# Patient Record
Sex: MALE | Race: WHITE | URBAN - METROPOLITAN AREA
[De-identification: names, ages, dates, MRNs, and addresses within clinical notes are randomized per-mention and may not be internally consistent; named-entity substitution may affect disease eponyms.]

---

## 2017-11-22 ENCOUNTER — OFFICE VISIT (OUTPATIENT)
Dept: FAMILY MEDICINE | Facility: CLINIC | Age: 32
End: 2017-11-22

## 2017-11-22 VITALS
TEMPERATURE: 98.5 F | HEART RATE: 96 BPM | DIASTOLIC BLOOD PRESSURE: 74 MMHG | OXYGEN SATURATION: 97 % | SYSTOLIC BLOOD PRESSURE: 112 MMHG | WEIGHT: 148 LBS | BODY MASS INDEX: 22.02 KG/M2 | RESPIRATION RATE: 16 BRPM

## 2017-11-22 DIAGNOSIS — B07.8 OTHER VIRAL WARTS: Primary | ICD-10-CM

## 2017-11-22 PROCEDURE — 17110 DESTRUCTION B9 LES UP TO 14: CPT | Performed by: FAMILY MEDICINE

## 2017-11-22 NOTE — MR AVS SNAPSHOT
After Visit Summary   11/22/2017    Joaquin Crow    MRN: [id number]           Patient Information     Date Of Birth          1985        Visit Information        Provider Department      11/22/2017 2:00 PM Antonino Polk MD; RF PROCEDURE ROOM [facility]        Today's Diagnoses     Other viral warts    -  1       Follow-ups after your visit        Who to contact     If you have questions or need follow up information about today's clinic visit or your schedule please contact [facility] directly at [phone].  Normal or non-critical lab and imaging results will be communicated to you by [employer], letter or phone within 4 business days after the clinic has received the results. If you do not hear from us within 7 days, please contact the clinic through Swapdomt or phone. If you have a critical or abnormal lab result, we will notify you by phone as soon as possible.  Submit refill requests through iLink or call your pharmacy and they will forward the refill request to us. Please allow 3 business days for your refill to be completed.          Additional Information About Your Visit        MyChart Information     iLink gives you secure access to your electronic health record. If you see a primary care provider, you can also send messages to your care team and make appointments. If you have questions, please call your primary care clinic.  If you do not have a primary care provider, please call [phone] and they will assist you.        Care EveryWhere ID     This is your Care EveryWhere ID. This could be used by other organizations to access your [facility] medical records  [id number]        Your Vitals Were     Pulse Temperature Respirations Pulse Oximetry BMI (Body Mass Index)       96 98.5  F (36.9  C) (Oral) 16 97% 22.02 kg/m2        Blood Pressure from Last 3 Encounters:   11/22/17 112/74   10/17/16 120/82   02/02/16 118/72    Weight from Last 3  Encounters:   11/22/17 67.1 kg (148 lb)   10/17/16 67.1 kg (148 lb)   02/02/16 68 kg (150 lb)              We Performed the Following     DESTRUCT BENIGN LESION, UP TO 14        Primary Care Provider Office Phone # Fax #    Antonino Polk [phone] [facility] [address]        Equal Access to Services     LUCIE PRAKASH : Hadii aad ku hadasho Soomaali, waaxda luqadaha, qaybta kaalmada adeegyada, waxay idiin hayaan adeeg kharash la'clyden . So [facility] [phone].    ATENCIÓN: Si habla español, tiene a barron disposición servicios gratuitos de asistencia lingüística. Llame al [phone].    We comply with applicable federal civil rights laws and [place] laws. We do not discriminate on the basis of race, color, national origin, age, disability, sex, sexual orientation, or gender identity.            Thank you!     Thank you for choosing [facility]  for your care. Our goal is always to provide you with excellent care. Hearing back from our patients is one way we can continue to improve our services. Please take a few minutes to complete the written survey that you may receive in the mail after your visit with us. Thank you!             Your Updated Medication List - Protect others around you: Learn how to safely use, store and throw away your medicines at www.disposemymeds.org.      Notice  As of 11/22/2017  2:34 PM    You have not been prescribed any medications.

## 2017-11-22 NOTE — PROGRESS NOTES
"3 warts on hands treated with shave and hyfrecation after alcohol prep and 1% lidocaine PF without epi. aquaphor and bandaid. RTC if not gone in 2-3 weeks for LN \"touch up\".  "

## 2019-03-29 ENCOUNTER — OFFICE VISIT (OUTPATIENT)
Dept: FAMILY MEDICINE | Facility: CLINIC | Age: 34
End: 2019-03-29

## 2019-03-29 VITALS
OXYGEN SATURATION: 98 % | BODY MASS INDEX: 20.23 KG/M2 | DIASTOLIC BLOOD PRESSURE: 80 MMHG | RESPIRATION RATE: 20 BRPM | TEMPERATURE: 98.9 F | WEIGHT: 136 LBS | SYSTOLIC BLOOD PRESSURE: 124 MMHG | HEART RATE: 88 BPM

## 2019-03-29 DIAGNOSIS — H00.14 CHALAZION LEFT UPPER EYELID: ICD-10-CM

## 2019-03-29 DIAGNOSIS — B07.8 OTHER VIRAL WARTS: Primary | ICD-10-CM

## 2019-03-29 PROCEDURE — 99213 OFFICE O/P EST LOW 20 MIN: CPT | Performed by: FAMILY MEDICINE

## 2019-03-29 RX ORDER — IMIQUIMOD 12.5 MG/.25G
CREAM TOPICAL
Qty: 12 PACKET | Refills: 1 | Status: SHIPPED | OUTPATIENT
Start: 2019-03-29

## 2019-03-29 NOTE — PROGRESS NOTES
SUBJECTIVE:  Chief Complaint:   Chief Complaint   Patient presents with     Wart     both hands. They have been treated multiple times with LN2 and paring-coming back worse.  Wants referral to derm to have laser treated.      Eye Problem     left eye growth x 3 months.      History of Present Illness:  Joaquin Crow is a 34 year old male who presents complaining of moderate left eye redness, eyelid swelling for 3 month(s).   Onset/timing: gradual, worsening.    Associated Signs and Symptoms: none  Treatment measures tried include: Sty treatment  Contact wearer : No    Past Medical History:   Diagnosis Date     Crohn's disease (H)      No current outpatient medications on file.      ROS:  CONSTITUTIONAL:NEGATIVE for fever, chills, change in weight  INTEGUMENTARY/SKIN: NEGATIVE for bruising     OBJECTIVE:  /80   Pulse 88   Temp 98.9  F (37.2  C) (Temporal)   Resp 20   Wt 61.7 kg (136 lb)   SpO2 98%   BMI 20.23 kg/m    General: no acute distress  Eye exam: right eye normal lid, conjunctiva, cornea, pupil and fundus, left eye abnormal findings: small growth noted internally L upper lid  Neck: supple, non-tender, free range of motion, no adenopathy  Heart: NORMAL - regular rate and rhythm without murmur.  Lungs: normal and clear to auscultation  SKIN: numerous warts on fingers    ASSESSMENT:  1. Other viral warts  Will refer and trial of aldara    - DERMATOLOGY REFERRAL  - imiquimod (ALDARA) 5 % external cream; Apply topically three times a week  Dispense: 12 packet; Refill: 1    2. Chalazion left upper eyelid  Referral for surgical removal  - OPHTHALMOLOGY ADULT REFERRAL

## 2019-09-30 ENCOUNTER — HEALTH MAINTENANCE LETTER (OUTPATIENT)
Age: 34
End: 2019-09-30

## 2021-01-15 ENCOUNTER — HEALTH MAINTENANCE LETTER (OUTPATIENT)
Age: 36
End: 2021-01-15

## 2021-10-24 ENCOUNTER — HEALTH MAINTENANCE LETTER (OUTPATIENT)
Age: 36
End: 2021-10-24

## 2022-02-13 ENCOUNTER — HEALTH MAINTENANCE LETTER (OUTPATIENT)
Age: 37
End: 2022-02-13

## 2022-04-18 ENCOUNTER — APPOINTMENT (OUTPATIENT)
Dept: URBAN - METROPOLITAN AREA CLINIC 256 | Age: 37
Setting detail: DERMATOLOGY
End: 2022-04-19

## 2022-04-18 VITALS — WEIGHT: 140 LBS | HEIGHT: 70 IN | RESPIRATION RATE: 15 BRPM

## 2022-04-18 DIAGNOSIS — B07.8 OTHER VIRAL WARTS: ICD-10-CM

## 2022-04-18 PROCEDURE — 11900 INJECT SKIN LESIONS </W 7: CPT

## 2022-04-18 PROCEDURE — OTHER BLEOMYCIN: OTHER

## 2022-04-18 PROCEDURE — OTHER MIPS QUALITY: OTHER

## 2022-04-18 ASSESSMENT — LOCATION SIMPLE DESCRIPTION DERM: LOCATION SIMPLE: LEFT MIDDLE FINGER

## 2022-04-18 ASSESSMENT — LOCATION DETAILED DESCRIPTION DERM: LOCATION DETAILED: LEFT MIDDLE PROXIMAL INTERPHALANGEAL JOINT

## 2022-04-18 ASSESSMENT — LOCATION ZONE DERM: LOCATION ZONE: FINGER

## 2022-04-18 NOTE — HPI: WART (PATIENT REPORTED)
Where Is Your Wart Located?: Fingers
Additional Comments (Use Complete Sentences): Patient has history of Crones disease. Patient reports having a 10 year history of stubborn warts that were resistant to most in clinic treatments.

## 2022-04-18 NOTE — PROCEDURE: BLEOMYCIN
Total Volume (Ccs): 0.2
Add 52 Modifier (Optional): no
Method Of Treatment: introduction with a bifurcated needle
Detail Level: Detailed
Concentration (Units/Cc): 1
Anesthesia Type: 1% lidocaine with epinephrine
Bill J-Code: yes
Consent: The patient's consent was obtained including but not limited to risks of crusting, scabbing, scarring, blistering, flagellate hyperpigmentation, local vasospasm, darker or lighter pigmentary change, recurrence, incomplete removal and infection.
Post-Care Instructions: I reviewed with the patient in detail post-care instructions. The patient understands that the treated areas should be washed off 6 to 8 hours after application.

## 2022-10-15 ENCOUNTER — HEALTH MAINTENANCE LETTER (OUTPATIENT)
Age: 37
End: 2022-10-15

## 2023-03-26 ENCOUNTER — HEALTH MAINTENANCE LETTER (OUTPATIENT)
Age: 38
End: 2023-03-26

## 2024-05-26 ENCOUNTER — HEALTH MAINTENANCE LETTER (OUTPATIENT)
Age: 39
End: 2024-05-26

## 2025-02-24 ENCOUNTER — OFFICE VISIT (OUTPATIENT)
Dept: FAMILY MEDICINE | Facility: CLINIC | Age: 40
End: 2025-02-24

## 2025-02-24 VITALS
SYSTOLIC BLOOD PRESSURE: 130 MMHG | DIASTOLIC BLOOD PRESSURE: 109 MMHG | WEIGHT: 142 LBS | OXYGEN SATURATION: 99 % | HEART RATE: 90 BPM | TEMPERATURE: 98.9 F

## 2025-02-24 DIAGNOSIS — R05.1 ACUTE COUGH: Primary | ICD-10-CM

## 2025-02-24 LAB
INFLUENZA A (RMG): NEGATIVE
INFLUENZA B (RMG): NEGATIVE
SARS ANTIGEN (RMG): NEGATIVE

## 2025-02-24 PROCEDURE — 87428 SARSCOV & INF VIR A&B AG IA: CPT | Performed by: STUDENT IN AN ORGANIZED HEALTH CARE EDUCATION/TRAINING PROGRAM

## 2025-02-24 PROCEDURE — 99203 OFFICE O/P NEW LOW 30 MIN: CPT | Performed by: STUDENT IN AN ORGANIZED HEALTH CARE EDUCATION/TRAINING PROGRAM

## 2025-02-24 ASSESSMENT — PATIENT HEALTH QUESTIONNAIRE - PHQ9
SUM OF ALL RESPONSES TO PHQ QUESTIONS 1-9: 5
SUM OF ALL RESPONSES TO PHQ QUESTIONS 1-9: 5

## 2025-02-24 ASSESSMENT — ENCOUNTER SYMPTOMS
SINUS PAIN: 1
MYALGIAS: 1
DIZZINESS: 1
COUGH: 1
VOMITING: 0
CHILLS: 1
SORE THROAT: 1
HEADACHES: 1

## 2025-02-24 NOTE — PROGRESS NOTES
Assessment & Plan   Problem List Items Addressed This Visit       Acute cough - Primary     -physical exam and symptoms consistent with viral infection followed by bacterial infection  -TMs had no erythema or bulging  -pharynx and tonsils have no exudates, no cervical lymphadenopathy, cough present  -clear to auscultation bilaterally in all lung fields  -pain with maxillary sinus palpation  -COVID/flu rapid: negative  -consistent with sinusitis   -patient denies problems with abx in past   -will prescribe augmentin   -discussed common side effect of GI upset  -patient to let us know if he worsens or does not improve despite treatment         Relevant Medications    amoxicillin-clavulanate (AUGMENTIN) 875-125 MG tablet    Other Relevant Orders    Influenza + SARS Antigen ([facility]) (Completed)      Return if symptoms worsen or fail to improve.    Jean Nuñez is a 39 year old, presenting for the following health issues:  URI (Chills, headache, sore throat, painful cough, body aches, congestion /Denies chest pain, SOB/Sx onset: 02/16/Has not done home covid testing)    History of Present Illness       Reason for visit:  Sinus infection headache sore throat  Symptom onset:  1-2 weeks ago  Symptoms include:  Sinus infection sore throat headache  Symptom intensity:  Moderate  Symptom progression:  Staying the same  Had these symptoms before:  Yes  Has tried/received treatment for these symptoms:  No  What makes it worse:  Blowing my nose  What makes it better:  Clearing my sinus   He is taking medications regularly.     Patient states that about 8 days ago, patient began having sore throat, congestion body aches, chills and cough. Patient states that he is experiencing headache, has some redness in his eye and some blood in mucus. Patient states that at the end of last week he got his strength back and now feels like he's going backwards. Patient endorses lingering sore throat, losing energy the best, not eating the  best, intermittent dizziness and no noted fever.    Patient states that he has been taking Excedrin for headache with minimal relief. Patient also has been using a throat numbing spray without relief.    Review of Systems   Constitutional:  Positive for chills and malaise/fatigue.   HENT:  Positive for congestion, sinus pain and sore throat.    Respiratory:  Positive for cough.    Gastrointestinal:  Negative for vomiting.   Musculoskeletal:  Positive for myalgias.   Neurological:  Positive for dizziness and headaches.          Objective    BP (!) 130/109   Pulse 90   Temp 98.9  F (37.2  C)   Wt 64.4 kg (142 lb)   SpO2 99%   There is no height or weight on file to calculate BMI.  Physical Exam  Constitutional:       Appearance: Normal appearance.   HENT:      Head: Normocephalic and atraumatic.      Right Ear: Tympanic membrane, ear canal and external ear normal.      Left Ear: Tympanic membrane, ear canal and external ear normal.      Mouth/Throat:      Mouth: Mucous membranes are moist.      Pharynx: No oropharyngeal exudate.   Eyes:      Conjunctiva/sclera:      Right eye: Right conjunctiva is injected.      Pupils: Pupils are equal, round, and reactive to light.   Cardiovascular:      Rate and Rhythm: Normal rate and regular rhythm.      Heart sounds: Normal heart sounds. No murmur heard.  Pulmonary:      Effort: Pulmonary effort is normal. No respiratory distress.      Breath sounds: Normal breath sounds.   Neurological:      Mental Status: He is alert.   Psychiatric:         Thought Content: Thought content normal.        Signed Electronically by: Miranda Wang DO

## 2025-02-24 NOTE — LETTER
2025    Joaquin Crow   1985        To Whom it May Concern;    Please excuse Joaquin Crow from work/school for a healthcare visit on 2025.    Sincerely,        Miranda Wang, DO

## 2025-02-24 NOTE — ASSESSMENT & PLAN NOTE
-physical exam and symptoms consistent with viral infection followed by bacterial infection  -TMs had no erythema or bulging  -pharynx and tonsils have no exudates, no cervical lymphadenopathy, cough present  -clear to auscultation bilaterally in all lung fields  -pain with maxillary sinus palpation  -COVID/flu rapid: negative  -consistent with sinusitis   -patient denies problems with abx in past   -will prescribe augmentin   -discussed common side effect of GI upset  -patient to let us know if he worsens or does not improve despite treatment

## 2025-06-14 ENCOUNTER — HEALTH MAINTENANCE LETTER (OUTPATIENT)
Age: 40
End: 2025-06-14